# Patient Record
Sex: FEMALE | Race: BLACK OR AFRICAN AMERICAN | NOT HISPANIC OR LATINO | Employment: OTHER | URBAN - METROPOLITAN AREA
[De-identification: names, ages, dates, MRNs, and addresses within clinical notes are randomized per-mention and may not be internally consistent; named-entity substitution may affect disease eponyms.]

---

## 2024-07-07 ENCOUNTER — HOSPITAL ENCOUNTER (EMERGENCY)
Facility: HOSPITAL | Age: 84
Discharge: HOME | End: 2024-07-08
Attending: EMERGENCY MEDICINE
Payer: MEDICARE

## 2024-07-07 DIAGNOSIS — R60.0 LOWER LEG EDEMA: Primary | ICD-10-CM

## 2024-07-07 PROCEDURE — 83880 ASSAY OF NATRIURETIC PEPTIDE: CPT | Performed by: EMERGENCY MEDICINE

## 2024-07-07 PROCEDURE — 84484 ASSAY OF TROPONIN QUANT: CPT | Performed by: EMERGENCY MEDICINE

## 2024-07-07 PROCEDURE — 84132 ASSAY OF SERUM POTASSIUM: CPT | Performed by: EMERGENCY MEDICINE

## 2024-07-07 PROCEDURE — 85025 COMPLETE CBC W/AUTO DIFF WBC: CPT | Performed by: EMERGENCY MEDICINE

## 2024-07-07 PROCEDURE — 99285 EMERGENCY DEPT VISIT HI MDM: CPT | Mod: 25 | Performed by: EMERGENCY MEDICINE

## 2024-07-07 PROCEDURE — 36415 COLL VENOUS BLD VENIPUNCTURE: CPT | Performed by: EMERGENCY MEDICINE

## 2024-07-07 PROCEDURE — 84132 ASSAY OF SERUM POTASSIUM: CPT | Mod: 59 | Performed by: EMERGENCY MEDICINE

## 2024-07-07 ASSESSMENT — PAIN SCALES - GENERAL: PAINLEVEL_OUTOF10: 0 - NO PAIN

## 2024-07-07 ASSESSMENT — COLUMBIA-SUICIDE SEVERITY RATING SCALE - C-SSRS
1. IN THE PAST MONTH, HAVE YOU WISHED YOU WERE DEAD OR WISHED YOU COULD GO TO SLEEP AND NOT WAKE UP?: NO
6. HAVE YOU EVER DONE ANYTHING, STARTED TO DO ANYTHING, OR PREPARED TO DO ANYTHING TO END YOUR LIFE?: NO
2. HAVE YOU ACTUALLY HAD ANY THOUGHTS OF KILLING YOURSELF?: NO

## 2024-07-07 ASSESSMENT — PAIN - FUNCTIONAL ASSESSMENT: PAIN_FUNCTIONAL_ASSESSMENT: 0-10

## 2024-07-08 ENCOUNTER — APPOINTMENT (OUTPATIENT)
Dept: RADIOLOGY | Facility: HOSPITAL | Age: 84
End: 2024-07-08
Payer: MEDICARE

## 2024-07-08 ENCOUNTER — APPOINTMENT (OUTPATIENT)
Dept: CARDIOLOGY | Facility: HOSPITAL | Age: 84
End: 2024-07-08
Payer: MEDICARE

## 2024-07-08 VITALS
WEIGHT: 135 LBS | RESPIRATION RATE: 16 BRPM | OXYGEN SATURATION: 100 % | HEIGHT: 67 IN | BODY MASS INDEX: 21.19 KG/M2 | SYSTOLIC BLOOD PRESSURE: 123 MMHG | DIASTOLIC BLOOD PRESSURE: 85 MMHG | TEMPERATURE: 98.8 F | HEART RATE: 69 BPM

## 2024-07-08 LAB
ALBUMIN SERPL BCP-MCNC: 4.3 G/DL (ref 3.4–5)
ALP SERPL-CCNC: 73 U/L (ref 33–136)
ALT SERPL W P-5'-P-CCNC: 34 U/L (ref 7–45)
ANION GAP BLDV CALCULATED.4IONS-SCNC: 11 MMOL/L (ref 10–25)
ANION GAP SERPL CALC-SCNC: 12 MMOL/L (ref 10–20)
APPEARANCE UR: CLEAR
AST SERPL W P-5'-P-CCNC: 31 U/L (ref 9–39)
BASE EXCESS BLDV CALC-SCNC: 2 MMOL/L (ref -2–3)
BASOPHILS # BLD AUTO: 0.04 X10*3/UL (ref 0–0.1)
BASOPHILS NFR BLD AUTO: 0.5 %
BILIRUB SERPL-MCNC: 0.5 MG/DL (ref 0–1.2)
BILIRUB UR STRIP.AUTO-MCNC: NEGATIVE MG/DL
BNP SERPL-MCNC: 171 PG/ML (ref 0–99)
BODY TEMPERATURE: 37 DEGREES CELSIUS
BUN SERPL-MCNC: 11 MG/DL (ref 6–23)
CA-I BLDV-SCNC: 1.31 MMOL/L (ref 1.1–1.33)
CALCIUM SERPL-MCNC: 9.7 MG/DL (ref 8.6–10.3)
CARDIAC TROPONIN I PNL SERPL HS: 5 NG/L (ref 0–13)
CHLORIDE BLDV-SCNC: 104 MMOL/L (ref 98–107)
CHLORIDE SERPL-SCNC: 104 MMOL/L (ref 98–107)
CO2 SERPL-SCNC: 25 MMOL/L (ref 21–32)
COLOR UR: ABNORMAL
CREAT SERPL-MCNC: 0.69 MG/DL (ref 0.5–1.05)
EGFRCR SERPLBLD CKD-EPI 2021: 87 ML/MIN/1.73M*2
EOSINOPHIL # BLD AUTO: 0.33 X10*3/UL (ref 0–0.4)
EOSINOPHIL NFR BLD AUTO: 4.2 %
ERYTHROCYTE [DISTWIDTH] IN BLOOD BY AUTOMATED COUNT: 13 % (ref 11.5–14.5)
GLUCOSE BLDV-MCNC: 96 MG/DL (ref 74–99)
GLUCOSE SERPL-MCNC: 84 MG/DL (ref 74–99)
GLUCOSE UR STRIP.AUTO-MCNC: NORMAL MG/DL
HCO3 BLDV-SCNC: 27.3 MMOL/L (ref 22–26)
HCT VFR BLD AUTO: 40.2 % (ref 36–46)
HCT VFR BLD EST: 41 % (ref 36–46)
HGB BLD-MCNC: 13.3 G/DL (ref 12–16)
HGB BLDV-MCNC: 13.7 G/DL (ref 12–16)
IMM GRANULOCYTES # BLD AUTO: 0.03 X10*3/UL (ref 0–0.5)
IMM GRANULOCYTES NFR BLD AUTO: 0.4 % (ref 0–0.9)
INHALED O2 CONCENTRATION: 21 %
KETONES UR STRIP.AUTO-MCNC: ABNORMAL MG/DL
LACTATE BLDV-SCNC: 1.4 MMOL/L (ref 0.4–2)
LEUKOCYTE ESTERASE UR QL STRIP.AUTO: NEGATIVE
LYMPHOCYTES # BLD AUTO: 2.92 X10*3/UL (ref 0.8–3)
LYMPHOCYTES NFR BLD AUTO: 37.2 %
MCH RBC QN AUTO: 31.4 PG (ref 26–34)
MCHC RBC AUTO-ENTMCNC: 33.1 G/DL (ref 32–36)
MCV RBC AUTO: 95 FL (ref 80–100)
MONOCYTES # BLD AUTO: 0.63 X10*3/UL (ref 0.05–0.8)
MONOCYTES NFR BLD AUTO: 8 %
NEUTROPHILS # BLD AUTO: 3.89 X10*3/UL (ref 1.6–5.5)
NEUTROPHILS NFR BLD AUTO: 49.7 %
NITRITE UR QL STRIP.AUTO: NEGATIVE
NRBC BLD-RTO: 0 /100 WBCS (ref 0–0)
OXYHGB MFR BLDV: 47.9 % (ref 45–75)
PCO2 BLDV: 44 MM HG (ref 41–51)
PH BLDV: 7.4 PH (ref 7.33–7.43)
PH UR STRIP.AUTO: 5.5 [PH]
PLATELET # BLD AUTO: 284 X10*3/UL (ref 150–450)
PO2 BLDV: 32 MM HG (ref 35–45)
POTASSIUM BLDV-SCNC: 4.3 MMOL/L (ref 3.5–5.3)
POTASSIUM SERPL-SCNC: 4.4 MMOL/L (ref 3.5–5.3)
PROT SERPL-MCNC: 7.8 G/DL (ref 6.4–8.2)
PROT UR STRIP.AUTO-MCNC: NEGATIVE MG/DL
RBC # BLD AUTO: 4.23 X10*6/UL (ref 4–5.2)
RBC # UR STRIP.AUTO: NEGATIVE /UL
SAO2 % BLDV: 49 % (ref 45–75)
SODIUM BLDV-SCNC: 138 MMOL/L (ref 136–145)
SODIUM SERPL-SCNC: 137 MMOL/L (ref 136–145)
SP GR UR STRIP.AUTO: >1.05
UROBILINOGEN UR STRIP.AUTO-MCNC: NORMAL MG/DL
WBC # BLD AUTO: 7.8 X10*3/UL (ref 4.4–11.3)

## 2024-07-08 PROCEDURE — 74177 CT ABD & PELVIS W/CONTRAST: CPT | Performed by: RADIOLOGY

## 2024-07-08 PROCEDURE — 93971 EXTREMITY STUDY: CPT | Performed by: RADIOLOGY

## 2024-07-08 PROCEDURE — 93005 ELECTROCARDIOGRAM TRACING: CPT

## 2024-07-08 PROCEDURE — 74177 CT ABD & PELVIS W/CONTRAST: CPT

## 2024-07-08 PROCEDURE — 71046 X-RAY EXAM CHEST 2 VIEWS: CPT | Performed by: RADIOLOGY

## 2024-07-08 PROCEDURE — 81003 URINALYSIS AUTO W/O SCOPE: CPT | Performed by: EMERGENCY MEDICINE

## 2024-07-08 PROCEDURE — 2500000001 HC RX 250 WO HCPCS SELF ADMINISTERED DRUGS (ALT 637 FOR MEDICARE OP): Performed by: EMERGENCY MEDICINE

## 2024-07-08 PROCEDURE — 2550000001 HC RX 255 CONTRASTS: Performed by: EMERGENCY MEDICINE

## 2024-07-08 PROCEDURE — 93971 EXTREMITY STUDY: CPT

## 2024-07-08 PROCEDURE — 71046 X-RAY EXAM CHEST 2 VIEWS: CPT

## 2024-07-08 RX ORDER — FUROSEMIDE 20 MG/1
20 TABLET ORAL DAILY
Qty: 7 TABLET | Refills: 0 | Status: SHIPPED | OUTPATIENT
Start: 2024-07-08 | End: 2024-07-15

## 2024-07-08 RX ORDER — ACETAMINOPHEN 325 MG/1
975 TABLET ORAL ONCE
Status: COMPLETED | OUTPATIENT
Start: 2024-07-08 | End: 2024-07-08

## 2024-07-08 RX ORDER — CYCLOBENZAPRINE HCL 10 MG
10 TABLET ORAL ONCE
Status: COMPLETED | OUTPATIENT
Start: 2024-07-08 | End: 2024-07-08

## 2024-07-08 ASSESSMENT — PAIN SCALES - GENERAL: PAINLEVEL_OUTOF10: 0 - NO PAIN

## 2024-07-08 NOTE — DISCHARGE INSTRUCTIONS
Take Lasix once daily for 7 days for leg swelling.  Please follow-up with your primary care physician to assess response to medication.  Return to emergency department for reassessment if new or worsening symptoms.

## 2024-07-08 NOTE — ED PROVIDER NOTES
Chief Complaint   Patient presents with    Rectal Bleeding    Vaginal Pain     History of Present Illness   Claudette Westley Floyd   MRN [id number]    83-year-old presents for evaluation of abdominal pain, rectal pain, vaginal pain and bright red blood per rectum.  History primarily obtained from patient's daughter at bedside who reports she is also DPOA and primary caretaker.  Patient description of symptoms is limited however patient's daughter noticed that she was very uncomfortable crying out when she was having a bowel movement and appeared to have rectal pain and vaginal pain and daughter noticed bright red blood per rectum.  Did have multiple bowel movements today that were reportedly soft.  Taking Eliquis for history of atrial fibrillation.  Previous ischemic stroke with right-sided deficits.  Typically uses a walker or motorized wheelchair.    Physical Exam   T 37.1 °C (98.8 °F)  HR 73  /61  RR 20  O2 97 % None (Room air)    General:  No acute distress.  Sitting up on stretcher.  Head: Atraumatic.  Eyes: No conjunctival injection.   Ears Nose Throat: Hearing grossly intact. No epistaxis. Oral mucosa moist.  Neck: Supple.  Cardiovascular: Extremities warm.   Pulmonary: No stridor. Normal respiratory effort.  Abdominal: Mild distention.  Soft.  Gastric tube without surrounding tenderness or drainage.  Generalized tenderness to palpation.  External rectal examination without hemorrhoid or apparent fissure.  No blood or melena visualized.  Musculoskeletal: No deformity or joint swelling.  Skin: No rash.  Psychiatric: Does not appear to respond to internal stimuli.  Neurologic: Alert. Follows directions. Face symmetric.  Chronic right upper and right lower extremity weakness.    ED Course & Medical Decision Making   Vital signs within normal limits.  Patient was nontoxic-appearing.  No apparent perirectal abnormality or visualized bleeding.  Due to advanced age, deformities, and inability to precisely  describe quality severity and location of pain, ordered CT of the abdomen pelvis with IV contrast and this was interpreted as no acute abnormality of the abdomen or pelvis.  Subsequently patient's daughter describes concerns regarding right lower leg edema.  Lower extremity venous duplex demonstrated no DVT.  Later she question whether patient may be experiencing chest pain because she believes she was holding or grabbing her left breast.  No apparent skin soft tissue swelling or abnormal appearance of the breast and chest wall.  Chest x-ray demonstrated cardiomegaly with mild interstitial prominence otherwise no acute abnormalities.  EKG demonstrated no acute ischemic changes and troponin was normal.  BNP was mildly elevated 171.  Recommended addition of Lasix for trial of diuresis for 1 week and follow-up with primary care physician.  Patient's daughter comfortable with plan of care and patient discharged in good condition.    ED Course as of 07/10/24 0311   Mon Jul 08, 2024   0254 ECG 12 lead  EKG interpreted by me.  7/8/2024 at 0251.  Atrial fibrillation 70 bpm.   QTc 470.  No ST elevation. [MC]      ED Course User Index  [MC] Sim Middleton MD         Diagnoses as of 07/10/24 0311   Lower leg edema            Sim Middleton MD  07/10/24 0314

## 2024-07-08 NOTE — ED TRIAGE NOTES
Pt arrives to triage with c/o inflamed rectum, bloody bowel movements, and vaginal pain. Pt's daughter states she noticed the bleeding and inflammation around the pt's anus yesterday. No constipation or diarrhea. Also reports recent diagnosis of aspiration pneumonia - finished Augmentin 7/3 but reports the pt's breathing has still seemed labored. Also concerned for swollen right foot worsening over the past month. Hx of stroke causing right sided weakness, hx of dimentia. A&Ox1

## 2024-07-13 LAB
Q ONSET: 211 MS
QRS COUNT: 11 BEATS
QRS DURATION: 120 MS
QT INTERVAL: 436 MS
QTC CALCULATION(BAZETT): 470 MS
QTC FREDERICIA: 459 MS
R AXIS: -56 DEGREES
T AXIS: 43 DEGREES
T OFFSET: 429 MS
VENTRICULAR RATE: 70 BPM

## 2025-06-25 ENCOUNTER — OFFICE VISIT (OUTPATIENT)
Dept: URGENT CARE | Age: 85
End: 2025-06-25
Payer: MEDICARE

## 2025-06-25 VITALS
OXYGEN SATURATION: 97 % | SYSTOLIC BLOOD PRESSURE: 111 MMHG | DIASTOLIC BLOOD PRESSURE: 72 MMHG | HEART RATE: 95 BPM | TEMPERATURE: 99.4 F | RESPIRATION RATE: 20 BRPM

## 2025-06-25 DIAGNOSIS — R50.9 FEVER, UNSPECIFIED FEVER CAUSE: Primary | ICD-10-CM

## 2025-06-25 DIAGNOSIS — Z86.79 HISTORY OF CHF (CONGESTIVE HEART FAILURE): ICD-10-CM

## 2025-06-25 DIAGNOSIS — R60.0 BILATERAL EDEMA OF LOWER EXTREMITY: ICD-10-CM

## 2025-06-25 PROCEDURE — 99203 OFFICE O/P NEW LOW 30 MIN: CPT

## 2025-06-25 PROCEDURE — 1159F MED LIST DOCD IN RCRD: CPT

## 2025-06-25 PROCEDURE — 1036F TOBACCO NON-USER: CPT

## 2025-06-25 RX ORDER — CYCLOBENZAPRINE HCL 10 MG
10 TABLET ORAL 3 TIMES DAILY
COMMUNITY

## 2025-06-25 RX ORDER — ACETAMINOPHEN 160 MG/5ML
650 SUSPENSION ORAL EVERY 6 HOURS
COMMUNITY
Start: 2022-10-14

## 2025-06-25 RX ORDER — ATORVASTATIN CALCIUM 40 MG/1
40 TABLET, FILM COATED ORAL
COMMUNITY
Start: 2022-04-08

## 2025-06-25 RX ORDER — FERROUS SULFATE 220 (44)/5
ELIXIR ORAL
COMMUNITY

## 2025-06-25 RX ORDER — APIXABAN 5 MG/1
1 TABLET, FILM COATED ORAL 2 TIMES DAILY
COMMUNITY
Start: 2021-06-16

## 2025-06-25 ASSESSMENT — ENCOUNTER SYMPTOMS
CHEST TIGHTNESS: 0
NAUSEA: 0
TROUBLE SWALLOWING: 0
COUGH: 0
CONSTIPATION: 0
CONFUSION: 1
SHORTNESS OF BREATH: 1
CHILLS: 1
DIARRHEA: 0
SORE THROAT: 0
WHEEZING: 1
RHINORRHEA: 0
PALPITATIONS: 0
FEVER: 1

## 2025-06-25 NOTE — PROGRESS NOTES
Subjective   Patient ID: Claudette Westley Floyd is a 84 y.o. female. They present today with a chief complaint of Edema (Bilateral foot swelling and right hand ).    History of Present Illness  84 year old female here for shortness of breath, new onset of edema, fever and generalized weakness. family member claims slight confusion. patient has been on lasix in the past for edema, not currently taking. Daughter explains that fever has been low grade between 100.1 and 101 only reduced slightly with tylenol.   She has a history of previous CVA. Daughter explains that she has a history of chf and pulmonary hypertension.   History given by patients daughter.       History provided by:  Parent  History limited by:  Age and mental status change  Edema  Associated symptoms: fever, shortness of breath and wheezing    Associated symptoms: no chest pain, no congestion, no cough, no diarrhea, no ear pain, no nausea, no rash, no rhinorrhea and no sore throat        Past Medical History  Allergies as of 06/25/2025 - Reviewed 06/25/2025   Allergen Reaction Noted    Codeine Other 07/07/2024    Penicillin g Other 07/07/2024       Prescriptions Prior to Admission[1]     Medical History[2]    Surgical History[3]     reports that she has never smoked. She has never used smokeless tobacco.    Review of Systems  Review of Systems   Constitutional:  Positive for chills and fever.   HENT:  Negative for congestion, ear pain, nosebleeds, postnasal drip, rhinorrhea, sore throat and trouble swallowing.    Respiratory:  Positive for shortness of breath and wheezing. Negative for cough and chest tightness.    Cardiovascular:  Positive for leg swelling. Negative for chest pain and palpitations.   Gastrointestinal:  Negative for constipation, diarrhea and nausea.   Skin:  Negative for rash.   Psychiatric/Behavioral:  Positive for confusion.                                   Objective    Vitals:    06/25/25 1954   BP: 111/72   Pulse: 95   Resp: 20    Temp: 37.4 °C (99.4 °F)   TempSrc: Oral   SpO2: 97%     No LMP recorded.    Physical Exam  HENT:      Head: Normocephalic and atraumatic.   Eyes:      Extraocular Movements: Extraocular movements intact.      Conjunctiva/sclera: Conjunctivae normal.      Pupils: Pupils are equal, round, and reactive to light.   Pulmonary:      Breath sounds: Examination of the right-middle field reveals rales. Examination of the left-middle field reveals rales. Examination of the right-lower field reveals rales. Examination of the left-lower field reveals rales. Rales present.   Musculoskeletal:      Right lower le+ Pitting Edema present.      Left lower le+ Pitting Edema present.   Neurological:      Mental Status: She is alert.         Procedures    Point of Care Test & Imaging Results from this visit  No results found for this visit on 25.   Imaging  No results found.    Cardiology, Vascular, and Other Imaging  No other imaging results found for the past 2 days      Diagnostic study results (if any) were reviewed by Shawna Dickson PA-C.    Assessment/Plan   Allergies, medications, history, and pertinent labs/EKGs/Imaging reviewed by Shawna Dickson PA-C.     Medical Decision Making  MDM- patient with history of chf presenting for acute onset of fever, b/l pitting edema and abnormal lung sounds.  I do not currently have xray in clinic to even get a chest xray. Did not delay care with an EKG. vitals are stable at this time.  I will be sending patient to ER for higher level of care for imaging, blood work. concern is for acute chf exacerbation. discharged with family member and private vehicle to [facility].  Case reviewed with supervising physician, Dr Ynes Ramos, who agrees with plan in er transfer.     Orders and Diagnoses  Diagnoses and all orders for this visit:  Fever, unspecified fever cause  Bilateral edema of lower extremity  History of CHF (congestive heart failure)      Medical Admin  Record      Patient disposition: ED    Electronically signed by Shawna Dickson PA-C  8:01 PM           [1] (Not in a hospital admission)   [2] No past medical history on file.  [3]   Past Surgical History:  Procedure Laterality Date    CT ANGIO NECK  4/4/2022    CT NECK ANGIO W AND WO IV CONTRAST 4/4/2022 [facility]ICAL [facility]GAC[facility]    CT HEAD ANGIO W AND WO IV CONTRAST  4/4/2022    CT HEAD ANGIO W AND WO IV CONTRAST 4/4/2022 [facility] CLINICAL LEGACY